# Patient Record
(demographics unavailable — no encounter records)

---

## 2025-05-05 NOTE — HISTORY OF PRESENT ILLNESS
Patient last seen on 01/25/18, rescheduled appointment for 08/28/18.   Last refill done on 01/25/18 # 180 x 1 refill.  Rx faxed to pharmacy.    [TextBox_4] : JONH is here today for evaluation.  He is a healthy child who was never circumcised.  The prepuce does not retract well.  He has not had any infections but does have ballooning of the prepuce with urination.  He has had discomfort with urination at times. No treatments have been started.  Parents noticed it about 2 months ago and seems to be getting bigger. No infections. No prior trauma or surgery  Circumcised as a

## 2025-05-05 NOTE — PHYSICAL EXAM
[Dysmorphic] : no dysmorphic [Labored breathing] : non- labored breathing [Rashes] : no rashes [TextBox_92] : circumcised with no redundant skin 1cm inclusion cyst on the ventrum

## 2025-05-05 NOTE — ASSESSMENT
[FreeTextEntry1] : Gee is a 10 month old male with a penile inclusion cyst. It is not something likely to resolve spontaneously. Therefore, we discussed excision of the cyst in the OR. I reviewed potential risks and the expected recovery from the procedure.  Will book this on the next day available.

## 2025-05-05 NOTE — HISTORY OF PRESENT ILLNESS
[TextBox_4] : JONH is here today for evaluation.  He is a healthy child who was never circumcised.  The prepuce does not retract well.  He has not had any infections but does have ballooning of the prepuce with urination.  He has had discomfort with urination at times. No treatments have been started.  Parents noticed it about 2 months ago and seems to be getting bigger. No infections. No prior trauma or surgery  Circumcised as a

## 2025-06-02 NOTE — PHYSICAL EXAM
[Well developed] : well developed [Dysmorphic] : no dysmorphic [Labored breathing] : non- labored breathing [TextBox_92] : circumcised with no adhesions. I do not see a cyst on the ventrum at all

## 2025-06-02 NOTE — HISTORY OF PRESENT ILLNESS
[TextBox_4] : JONH presents today for a follow-up visit. Circumcised as a . At his initial consultation (25), penile inclusion cyst noted on exam. Excision surgery currently scheduled for 25. Returns today for reexamination. When I saw Jonh in May, I felt his 1cm ventral inclusion cyst would not spontaneously resolve.  So we discussed doing an excision of the cyst in the OR. However, his parents noticed steady improvement of the cyst (dad was applying Bacitracin to the area) Now it appears resolved

## 2025-06-02 NOTE — ASSESSMENT
[FreeTextEntry1] : Gee is an 11 month old male who appears to have had spontaneous resolution of his inclusion cyst.  I am happily surprised by this development.  I cannot verify that he will not have recurrence.  But at this point, I do not see any abnormality that warrants surgery. So, we will cancel his OR case for this Thursday and leave his follow up prn.